# Patient Record
Sex: FEMALE | Race: WHITE | NOT HISPANIC OR LATINO | Employment: OTHER | URBAN - METROPOLITAN AREA
[De-identification: names, ages, dates, MRNs, and addresses within clinical notes are randomized per-mention and may not be internally consistent; named-entity substitution may affect disease eponyms.]

---

## 2018-02-02 ENCOUNTER — HOSPITAL ENCOUNTER (EMERGENCY)
Facility: CLINIC | Age: 68
Discharge: HOME | End: 2018-02-02
Attending: EMERGENCY MEDICINE | Admitting: EMERGENCY MEDICINE
Payer: COMMERCIAL

## 2018-02-02 VITALS
OXYGEN SATURATION: 97 % | RESPIRATION RATE: 16 BRPM | SYSTOLIC BLOOD PRESSURE: 131 MMHG | DIASTOLIC BLOOD PRESSURE: 80 MMHG | TEMPERATURE: 97.4 F

## 2018-02-02 DIAGNOSIS — R21 RASH AND NONSPECIFIC SKIN ERUPTION: ICD-10-CM

## 2018-02-02 PROCEDURE — 99282 EMERGENCY DEPT VISIT SF MDM: CPT

## 2018-02-02 RX ORDER — HYDROXYZINE HYDROCHLORIDE 25 MG/1
25-50 TABLET, FILM COATED ORAL EVERY 6 HOURS PRN
Qty: 25 TABLET | Refills: 1 | Status: SHIPPED | OUTPATIENT
Start: 2018-02-02

## 2018-02-02 NOTE — ED AVS SNAPSHOT
[facility] Emergency Department    [address] [place] [address]    Phone:  [phone]    Fax:  [phone]                                       Paola An   MRN: [id number]    Department:  [facility] Emergency Department   Date of Visit:  2/2/2018           Patient Information     Date Of Birth          1950        Your diagnoses for this visit were:     Rash and nonspecific skin eruption        You were seen by Aldo Kerr MD.        Discharge Instructions       Please see a dermatologist (skin doctor) in the next 2 days for a recheck.    [facility]: [phone]  or  [facility]:  [phone]    Return to the Emergency Room if you develop fevers more than 101, worsening redness in the area or spreading redness in the affected area or if you have any new concerns about your health.        It was my pleasure to take care of you today. Thanks for visiting [facility] Emergency Room.     Aldo Kerr MD        24 Hour Appointment Hotline       To make an appointment at any [facility] clinic, call [phone] ([phone]). If you don't have a family doctor or clinic, we will help you find one. [facility] clinics are conveniently located to serve the needs of you and your family.             Review of your medicines      START taking        Dose / Directions Last dose taken    hydrOXYzine 25 MG tablet   Commonly known as:  ATARAX   Dose:  25-50 mg   Quantity:  25 tablet        Take 1-2 tablets (25-50 mg) by mouth every 6 hours as needed for itching   Refills:  1          Our records show that you are taking the medicines listed below. If these are incorrect, please call your family doctor or clinic.        Dose / Directions Last dose taken    AMLODIPINE BESYLATE PO   Dose:  2.5 mg        Take 2.5 mg by mouth daily   Refills:  0        ATENOLOL PO   Dose:  50 mg        Take 50 mg by mouth 2 times daily   Refills:  0         CARISOPRODOL PO   Dose:  350 mg        Take 350 mg by mouth 3 times daily   Refills:  0        CLONAZEPAM PO   Dose:  1 mg        Take 1 mg by mouth   Refills:  0        ESCITALOPRAM OXALATE PO   Dose:  20 mg        Take 20 mg by mouth   Refills:  0        ESTRADIOL PO   Dose:  1 mg        Take 1 mg by mouth daily   Refills:  0        LOVASTATIN PO   Dose:  40 mg        Take 40 mg by mouth At Bedtime   Refills:  0        PANTOPRAZOLE SODIUM PO   Dose:  40 mg        Take 40 mg by mouth   Refills:  0                Prescriptions were sent or printed at these locations (1 Prescription)                   Other Prescriptions                Printed at Department/Unit printer (1 of 1)         hydrOXYzine (ATARAX) 25 MG tablet                Orders Needing Specimen Collection     None      Pending Results     No orders found from 1/31/2018 to 2/3/2018.            Pending Culture Results     No orders found from 1/31/2018 to 2/3/2018.            Pending Results Instructions     If you had any lab results that were not finalized at the time of your Discharge, you can call the ED Lab Result RN at [phone]. You will be contacted by this team for any positive Lab results or changes in treatment. The nurses are available 7 days a week from 10A to 6:30P.  You can leave a message 24 hours per day and they will return your call.        Test Results From Your Hospital Stay               Clinical Quality Measure: Blood Pressure Screening     Your blood pressure was checked while you were in the emergency department today. The last reading we obtained was  BP: 131/80 . Please read the guidelines below about what these numbers mean and what you should do about them.  If your systolic blood pressure (the top number) is less than 120 and your diastolic blood pressure (the bottom number) is less than 80, then your blood pressure is normal. There is nothing more that you need to do about it.  If your systolic blood pressure (the  "top number) is 120-139 or your diastolic blood pressure (the bottom number) is 80-89, your blood pressure may be higher than it should be. You should have your blood pressure rechecked within a year by a primary care provider.  If your systolic blood pressure (the top number) is 140 or greater or your diastolic blood pressure (the bottom number) is 90 or greater, you may have high blood pressure. High blood pressure is treatable, but if left untreated over time it can put you at risk for heart attack, stroke, or kidney failure. You should have your blood pressure rechecked by a primary care provider within the next 4 weeks.  If your provider in the emergency department today gave you specific instructions to follow-up with your doctor or provider even sooner than that, you should follow that instruction and not wait for up to 4 weeks for your follow-up visit.        Thank you for choosing [facility]       Thank you for choosing [facility] for your care. Our goal is always to provide you with excellent care. Hearing back from our patients is one way we can continue to improve our services. Please take a few minutes to complete the written survey that you may receive in the mail after you visit with us. Thank you!        [email or web address] Information     [employer] lets you send messages to your doctor, view your test results, renew your prescriptions, schedule appointments and more. To sign up, go to [email or web address] . Click on \"Log in\" on the left side of the screen, which will take you to the Welcome page. Then click on \"Sign up Now\" on the right side of the page.     You will be asked to enter the access code listed below, as well as some personal information. Please follow the directions to create your username and password.     Your access code is: [email or web address]  Expires: 5/3/2018  4:01 PM     Your access code will  in 90 days. If you need help or a new code, please call your [facility] clinic or [phone].      "   Care EveryWhere ID     This is your Care EveryWhere ID. This could be used by other organizations to access your [facility] medical records  [id number]        Equal Access to Services     MOLLY DE JESUS : Mario Yañez, suha orosco, bebeto gutierres, titi marmolejo. So [facility] [phone].    ATENCIÓN: Si habla español, tiene a palomino disposición servicios gratuitos de asistencia lingüística. Llame al [phone].    We comply with applicable federal civil rights laws and [place] laws. We do not discriminate on the basis of race, color, national origin, age, disability, sex, sexual orientation, or gender identity.            After Visit Summary       This is your record. Keep this with you and show to your community pharmacist(s) and doctor(s) at your next visit.

## 2018-02-02 NOTE — DISCHARGE INSTRUCTIONS
Please see a dermatologist (skin doctor) in the next 2 days for a recheck.    [facility]: [phone]  or  [facility]:  [phone]    Return to the Emergency Room if you develop fevers more than 101, worsening redness in the area or spreading redness in the affected area or if you have any new concerns about your health.        It was my pleasure to take care of you today. Thanks for visiting [facility] Emergency Room.     Aldo Kerr MD

## 2018-02-02 NOTE — ED AVS SNAPSHOT
[facility] Emergency Department    [address]    [facility] [address]    Phone:  [phone]    Fax:  [phone]                                       Paola An   MRN: [id number]    Department:  [facility] Emergency Department   Date of Visit:  2/2/2018           After Visit Summary Signature Page     I have received my discharge instructions, and my questions have been answered. I have discussed any challenges I see with this plan with the nurse or doctor.    ..........................................................................................................................................  Patient/Patient Representative Signature      ..........................................................................................................................................  Patient Representative Print Name and Relationship to Patient    ..................................................               ................................................  Date                                            Time    ..........................................................................................................................................  Reviewed by Signature/Title    ...................................................              ..............................................  Date                                                            Time

## 2018-02-02 NOTE — ED PROVIDER NOTES
History     Chief Complaint:  Inset Bites     HPI   Paola An is a 67 year old female who presents for evaluation of insect bites. The patient reports that since September of 2017 she has been getting bitten by insects in her sleep and has developed an itchy rash surrounding the bites. These bites are most prominent over her extremities. She believes that the insects are either flees or mites. She had a terminator inspect her bed who did not feel that these were bed bugs. She does not live with any pets.     Allergies:  Sulfa drugs  Codeine  Benadryl     Medications:    Pantoprazole  Escitalopram oxalate  Atenolol  Lovastatin  Clonazepam  Estradiol  Amlodipine besylate  Carisoprodol     Past Medical History:    Hypertension     Past Surgical History:    History reviewed. No pertinent past surgical history.     Family History:    History reviewed. No pertinent family history.     Social History:  Marital status:    Single   Accompanied to ED by:  Alone     Review of Systems   Skin:        (+) Insect bites    All other systems reviewed and are negative.    Physical Exam   First Vitals:  BP: 131/80  Heart Rate: 71  Temp: 97.4  F (36.3  C)  Resp: 16  SpO2: 97 %      Physical Exam  General: Patient is alert and interactive when I enter the room  Head:  The scalp, face, and head appear normal  Eyes:  The pupils are equal, round, and reactive to light    Conjunctivae and sclerae are normal  ENT:    External acoustic canals are normal    The oropharynx is normal without erythema.     Uvula is in the midline  Neck:  Normal range of motion  CV:  Regular rate. S1/S2. No murmurs.   Resp:  Lungs are clear without wheezes or rales. No distress  GI:  Abdomen is soft, no rigidity, guarding, or rebound    No distension. No tenderness to palpation in any quadrant.     MS:  Normal tone. Joints grossly normal without effusions.     No asymmetric leg swelling, calf or thigh tenderness.      Normal motor assessment of all  extremities.  Skin:  Scattered excoriated red to scabbed over papules, no vesicles, no cellulitis.     Normal capillary refill noted  Neuro: Speech is normal and fluent. Face is symmetric.     Moving all extremities well.   Psych:  Awake. Alert.  Normal affect.  Appropriate interactions.  Lymph: No anterior cervical lymphadenopathy noted    Emergency Department Course     Emergency Department Course:  Nursing notes and vitals reviewed.  1527: I performed an exam of the patient as documented above.     Findings and plan explained to the Patient. Patient discharged home with instructions regarding supportive care, medications, and reasons to return. The importance of close follow-up was reviewed. The patient was prescribed Hydroxyzine.      Impression & Plan      Medical Decision Making:  Paola An is a 67 year old female who presents for evaluation of a rash.  This appears consistent with a dermatitis.  A broad differential was considered including infection associated with rash, SBI, cellulitis, atopic dermatitis, allergic dermatitis, contact dermatitis, chemical dermatitis, lice, scabies, environmental, etc. I personally looked under the microscope and all the debris she brings in in the baggy and there are no bugs, just fibers and occasional what appears to be scabs.  outpatient regimen as noted above.  See dermatology  Her apartment has been extensively treated and I do not think this is bedbugs or scabies or fleas.  Diagnosis:    ICD-10-CM   1. Rash and nonspecific skin eruption R21       Disposition:  Discharged to home with Atarax.     Discharge Medications:  New Prescriptions    HYDROXYZINE (ATARAX) 25 MG TABLET    Take 1-2 tablets (25-50 mg) by mouth every 6 hours as needed for itching         I, Lyndon Beck, am serving as a scribe at 3:27 PM on 2/2/2018 to document services personally performed by Dr. Kerr, based on my observations and the provider's statements to me.      [facility]  [facility] EMERGENCY DEPARTMENT       Aldo Kerr MD  02/02/18 1680

## 2018-02-02 NOTE — ED NOTES
Pt c/o bug bites. Pt states wakes up with bites and has bugs in bed. Has had terminator come and they said its not bed bugs. Patient states been having issues since September and currently living with a friend. ABCs intact and A&Ox4.

## 2018-02-09 ENCOUNTER — HOSPITAL ENCOUNTER (EMERGENCY)
Facility: CLINIC | Age: 68
Discharge: HOME | End: 2018-02-09
Attending: EMERGENCY MEDICINE | Admitting: EMERGENCY MEDICINE
Payer: COMMERCIAL

## 2018-02-09 VITALS — HEART RATE: 59 BPM | TEMPERATURE: 96.3 F | OXYGEN SATURATION: 100 % | RESPIRATION RATE: 16 BRPM

## 2018-02-09 DIAGNOSIS — R21 RASH: ICD-10-CM

## 2018-02-09 PROCEDURE — 99283 EMERGENCY DEPT VISIT LOW MDM: CPT

## 2018-02-09 RX ORDER — AZITHROMYCIN 250 MG/1
TABLET, FILM COATED ORAL
Qty: 6 TABLET | Refills: 0 | Status: SHIPPED | OUTPATIENT
Start: 2018-02-09 | End: 2018-02-14

## 2018-02-09 ASSESSMENT — ENCOUNTER SYMPTOMS
FEVER: 0
HEADACHES: 1
COLOR CHANGE: 0
WOUND: 0
CHILLS: 0

## 2018-02-09 NOTE — ED AVS SNAPSHOT
[facility] Emergency Department    [address]    [facility] [address]    Phone:  [phone]    Fax:  [phone]                                       Paola An   MRN: [id number]    Department:  [facility] Emergency Department   Date of Visit:  2/9/2018           After Visit Summary Signature Page     I have received my discharge instructions, and my questions have been answered. I have discussed any challenges I see with this plan with the nurse or doctor.    ..........................................................................................................................................  Patient/Patient Representative Signature      ..........................................................................................................................................  Patient Representative Print Name and Relationship to Patient    ..................................................               ................................................  Date                                            Time    ..........................................................................................................................................  Reviewed by Signature/Title    ...................................................              ..............................................  Date                                                            Time

## 2018-02-09 NOTE — ED AVS SNAPSHOT
[facility] Emergency Department    [address] [place] [address]    Phone:  [phone]    Fax:  [phone]                                       Paola An   MRN: [id number]    Department:  [facility] Emergency Department   Date of Visit:  2/9/2018           Patient Information     Date Of Birth          1950        Your diagnoses for this visit were:     Rash        You were seen by Jose Cintron MD.      Follow-up Information     Follow up with [facility]. Go in 1 week.    Specialty:  Family Medicine    Contact information:    [address]  [facility] [address] [phone]        Discharge Instructions       Follow up with your clinic for stool culture results    24 Hour Appointment Hotline       To make an appointment at any [facility], call [phone] ([phone]). If you don't have a family doctor or clinic, we will help you find one. [facility] clinics are conveniently located to serve the needs of you and your family.          ED Discharge Orders     Ova and Parasite Exam Routine                    Review of your medicines      Our records show that you are taking the medicines listed below. If these are incorrect, please call your family doctor or clinic.        Dose / Directions Last dose taken    AMLODIPINE BESYLATE PO   Dose:  2.5 mg        Take 2.5 mg by mouth daily   Refills:  0        ATENOLOL PO   Dose:  50 mg        Take 50 mg by mouth 2 times daily   Refills:  0        CARISOPRODOL PO   Dose:  350 mg        Take 350 mg by mouth 3 times daily   Refills:  0        CLONAZEPAM PO   Dose:  1 mg        Take 1 mg by mouth   Refills:  0        ESCITALOPRAM OXALATE PO   Dose:  20 mg        Take 20 mg by mouth   Refills:  0        ESTRADIOL PO   Dose:  1 mg        Take 1 mg by mouth daily   Refills:  0        hydrOXYzine 25 MG tablet   Commonly known as:  ATARAX   Dose:  25-50 mg   Quantity:  25  tablet        Take 1-2 tablets (25-50 mg) by mouth every 6 hours as needed for itching   Refills:  1        LOVASTATIN PO   Dose:  40 mg        Take 40 mg by mouth At Bedtime   Refills:  0        PANTOPRAZOLE SODIUM PO   Dose:  40 mg        Take 40 mg by mouth   Refills:  0                Orders Needing Specimen Collection     None      Pending Results     No orders found from 2/7/2018 to 2/10/2018.            Pending Culture Results     No orders found from 2/7/2018 to 2/10/2018.            Pending Results Instructions     If you had any lab results that were not finalized at the time of your Discharge, you can call the ED Lab Result RN at [phone]. You will be contacted by this team for any positive Lab results or changes in treatment. The nurses are available 7 days a week from 10A to 6:30P.  You can leave a message 24 hours per day and they will return your call.        Test Results From Your Hospital Stay               Clinical Quality Measure: Blood Pressure Screening     Your blood pressure was checked while you were in the emergency department today. The last reading we obtained was    . Please read the guidelines below about what these numbers mean and what you should do about them.  If your systolic blood pressure (the top number) is less than 120 and your diastolic blood pressure (the bottom number) is less than 80, then your blood pressure is normal. There is nothing more that you need to do about it.  If your systolic blood pressure (the top number) is 120-139 or your diastolic blood pressure (the bottom number) is 80-89, your blood pressure may be higher than it should be. You should have your blood pressure rechecked within a year by a primary care provider.  If your systolic blood pressure (the top number) is 140 or greater or your diastolic blood pressure (the bottom number) is 90 or greater, you may have high blood pressure. High blood pressure is treatable, but if left untreated over time it  "can put you at risk for heart attack, stroke, or kidney failure. You should have your blood pressure rechecked by a primary care provider within the next 4 weeks.  If your provider in the emergency department today gave you specific instructions to follow-up with your doctor or provider even sooner than that, you should follow that instruction and not wait for up to 4 weeks for your follow-up visit.        Thank you for choosing [facility]       Thank you for choosing [facility] for your care. Our goal is always to provide you with excellent care. Hearing back from our patients is one way we can continue to improve our services. Please take a few minutes to complete the written survey that you may receive in the mail after you visit with us. Thank you!        [employer] Information     [employer] lets you send messages to your doctor, view your test results, renew your prescriptions, schedule appointments and more. To sign up, go to [email or web address] . Click on \"Log in\" on the left side of the screen, which will take you to the Welcome page. Then click on \"Sign up Now\" on the right side of the page.     You will be asked to enter the access code listed below, as well as some personal information. Please follow the directions to create your username and password.     Your access code is: [email or web address]  Expires: 5/3/2018  4:01 PM     Your access code will  in 90 days. If you need help or a new code, please call your [facility] clinic or [phone].        Care EveryWhere ID     This is your Care EveryWhere ID. This could be used by other organizations to access your [facility] medical records  [id number]        Equal Access to Services     [facility] Hospital[facility]ERRICA : Hadii waqar Yañez, waaxda luqadaha, qaybta kaaltiti carranza . So [facility] [phone].    ATENCIÓN: Si habla español, tiene a palomino disposición servicios gratuitos de asistencia lingüística. Llame al " [phone].    We comply with applicable federal civil rights laws and [place] laws. We do not discriminate on the basis of race, color, national origin, age, disability, sex, sexual orientation, or gender identity.            After Visit Summary       This is your record. Keep this with you and show to your community pharmacist(s) and doctor(s) at your next visit.

## 2018-02-09 NOTE — ED NOTES
Patient states she had a bug infestation when she lived in her house in [place].  Would wake up with bug parts in her bed and bites all over her.  Now, when she blows her nose she finds bug body parts in the sputum.  Also has been losing weight and is concerned she might have a tapeworm.   Has also been getting headaches since the bug bites started.      ABCs intact.  Alert and oriented x 3.

## 2018-02-10 NOTE — ED PROVIDER NOTES
History     Chief Complaint:  Concern for Bugs in Body    HPI   Paola An is a 67 year old female with a history of hypertension who presents with a concern about bugs in her body. The patient states she first noticed a bug infestation in her home in [place] about 5 months ago. She notes she has had multiple inspectors out to check for the bugs and they sprayed her home, but were unable to determine if there were any bugs present. She states she has never seen a full insect, but has noticed little pieces of bugs throughout her home, especially in her bed. The patient was recently seen here by Dr. Kerr on 2/2/18 for a rash from the bug bites, and he prescribed Hydroxyzine for the rash. Today, the patient presented to the ED because she is concerned about the bugs infesting her body. The patient reports she has been blowing these small black bug-like objects out of her nose throughout the day. She states she continues to be bitten by them since she moved to [place] and has continued to have a headache and lose weight, all of which are symptoms she noticed shortly after the bug infestation. The patient notes the bites do not itch very long. The patient reports she is concerned about having tapeworm because she read that flea ingestion can cause tapeworm and, since she has been losing weight, she thought this was a reasonable conclusion. She states she has not been to a dermatologist since her previous visit to the ED one week ago.    Allergies:  Sulfa drugs: anaphylaxis  Codeine: nightmares  Benadryl: palpitations     Medications:    Pantoprazole sodium  Escitalopram oxalate  Atenolol  Lovastatin  Clonazepam  Estradiol  Amlodipine besylate  Carisoprodol  Hydroxyzine    Past Medical History:    Hypertension    Past Surgical History:    History reviewed. No pertinent surgical history.    Family History:    History reviewed. No pertinent family history.     Social History:  Smoking status: No  Alcohol use:  No  Marital Status:  [4]     Review of Systems   Constitutional: Negative for chills and fever.   HENT: Negative for congestion.    Skin: Negative for color change, rash and wound.   Neurological: Positive for headaches.   All other systems reviewed and are negative.    Physical Exam     Patient Vitals for the past 24 hrs:   Temp Temp src Pulse Resp SpO2   02/09/18 1629 96.3  F (35.7  C) Temporal 59 16 100 %     Physical Exam   Constitutional: She appears well-developed and well-nourished.   HENT:   Right Ear: External ear normal.   Left Ear: External ear normal.   Mouth/Throat: Oropharynx is clear and moist. No oropharyngeal exudate.   TM's clear bilaterally   Eyes: Conjunctivae are normal. Pupils are equal, round, and reactive to light. No scleral icterus.   Neck: Normal range of motion. Neck supple.   Cardiovascular: Normal rate, regular rhythm, normal heart sounds and intact distal pulses.  Exam reveals no gallop and no friction rub.    No murmur heard.  Pulmonary/Chest: Effort normal and breath sounds normal. No respiratory distress. She has no wheezes. She has no rales.   Abdominal: Soft. Bowel sounds are normal. She exhibits no distension and no mass. There is no tenderness.   Lymphadenopathy:     She has no cervical adenopathy.   Neurological: She is alert.   Skin: Skin is warm and dry. No rash noted.   Psychiatric: She has a normal mood and affect.     Emergency Department Course   Emergency Department Course:  Past medical records, nursing notes, and vitals reviewed.  1803: I performed an exam of the patient and obtained history, as documented above.    1856: I rechecked the patient. I answered the patient's questions at this time.    I rechecked the patient. Findings and plan explained to the patient. Patient discharged home with instructions regarding supportive care, medications, and reasons to return. The importance of close follow-up was reviewed.     Impression & Plan    Medical Decision  Making:  Paola An is a 67 year old female who presents to the ED with a concern for a possible bug infestation. The patient has very non-specific non-pruritic, red rash on her legs and arms. There was nothing on her trunk that I could see. She was already here one week ago and evaluated by Dr. Kerr who thought she probably had dermatitis. She showed me something she blew out of her nose this morning that looked like pieces of dried up blood clots and some nasal drainage. I did not actually see any bugs, but I could not verify as it was quite smashed together. I asked her to have an  come by to her current place of residence and check it out. She was concerned that she might have tapeworm or some type of a parasite in body, so I ordered an outpatient stool culture that she can have evaluated. She should follow-up with her regular doctor for the culture results. She was understanding of this. I see no reason to do any further laboratory evaluation. I did ask her if she wanted to have a CBC checked for anemia, but she declined. I do not see anything to suggest scabies, fleas, or lice. I am not sure what she is seeing, but she is also apparently contacting a different  to come by and check it out. I encouraged her to follow-up with that and also with dermatology.    Diagnosis:    ICD-10-CM   1. Rash R21     Disposition: Discharged to home    Discharge Medications:  New Prescriptions    AZITHROMYCIN (ZITHROMAX Z-JUAN) 250 MG TABLET    Two tablets on the first day, then one tablet daily for the next 4 days     Mari Chang  2/9/2018   [facility] EMERGENCY DEPARTMENT    I, Mari Chang, am serving as a scribe at 6:03 PM on 2/9/2018 to document services personally performed by Jose Cintron MD based on my observations and the provider's statements to me.          Jose Cintron MD  02/10/18 0153

## 2018-02-13 ENCOUNTER — HOSPITAL ENCOUNTER (OUTPATIENT)
Dept: LAB | Facility: CLINIC | Age: 68
Discharge: HOME | End: 2018-02-13
Attending: EMERGENCY MEDICINE | Admitting: EMERGENCY MEDICINE
Payer: COMMERCIAL

## 2018-02-13 DIAGNOSIS — R21 RASH: ICD-10-CM

## 2018-02-13 PROCEDURE — 87177 OVA AND PARASITES SMEARS: CPT | Performed by: EMERGENCY MEDICINE

## 2018-02-13 PROCEDURE — 87209 SMEAR COMPLEX STAIN: CPT | Performed by: EMERGENCY MEDICINE

## 2018-02-14 LAB
O+P STL MICRO: NORMAL
O+P STL MICRO: NORMAL
SPECIMEN SOURCE: NORMAL

## 2019-04-24 ENCOUNTER — COMMUNICATION - HEALTHEAST (OUTPATIENT)
Dept: SCHEDULING | Facility: CLINIC | Age: 69
End: 2019-04-24

## 2021-05-28 NOTE — TELEPHONE ENCOUNTER
RN Triage:     Patient calling in with a question on her release from the hospital. She has questions about receiving opioids during her hospital stay. Per juan review she had orders for tramadol. No opioids given.     Florencia Hilliard RN, BSN [facility] Triage Nurse    Reason for Disposition    Caller has medication question only, adult not sick, and triager answers question    Protocols used: MEDICATION QUESTION CALL-A-

## 2021-06-16 PROBLEM — G89.29 CHRONIC BILATERAL LOW BACK PAIN WITHOUT SCIATICA: Status: ACTIVE | Noted: 2018-05-08

## 2021-06-16 PROBLEM — L98.8 MORGELLONS SYNDROME: Status: ACTIVE | Noted: 2018-05-09

## 2021-06-16 PROBLEM — I95.9 HYPOTENSION: Status: ACTIVE | Noted: 2019-04-11

## 2021-06-16 PROBLEM — F29 PSYCHOSIS (H): Status: ACTIVE | Noted: 2018-05-08

## 2021-06-16 PROBLEM — M54.50 CHRONIC BILATERAL LOW BACK PAIN WITHOUT SCIATICA: Status: ACTIVE | Noted: 2018-05-08

## 2024-03-20 ENCOUNTER — HOSPITAL ENCOUNTER (EMERGENCY)
Facility: CLINIC | Age: 74
Discharge: HOME | End: 2024-03-21
Attending: EMERGENCY MEDICINE | Admitting: EMERGENCY MEDICINE
Payer: COMMERCIAL

## 2024-03-20 DIAGNOSIS — S90.424A BLISTER OF TOE OF RIGHT FOOT WITHOUT INFECTION, INITIAL ENCOUNTER: ICD-10-CM

## 2024-03-20 DIAGNOSIS — I10 HYPERTENSION, UNSPECIFIED TYPE: ICD-10-CM

## 2024-03-20 DIAGNOSIS — F22 DELUSIONS OF PARASITOSIS (H): ICD-10-CM

## 2024-03-20 LAB
ALBUMIN UR-MCNC: NEGATIVE MG/DL
ANION GAP SERPL CALCULATED.3IONS-SCNC: 11 MMOL/L (ref 7–15)
APPEARANCE UR: CLEAR
BASOPHILS # BLD AUTO: 0.1 10E3/UL (ref 0–0.2)
BASOPHILS NFR BLD AUTO: 1 %
BILIRUB UR QL STRIP: NEGATIVE
BUN SERPL-MCNC: 14.1 MG/DL (ref 8–23)
CALCIUM SERPL-MCNC: 9.6 MG/DL (ref 8.8–10.2)
CHLORIDE SERPL-SCNC: 104 MMOL/L (ref 98–107)
COLOR UR AUTO: ABNORMAL
CREAT SERPL-MCNC: 0.97 MG/DL (ref 0.51–0.95)
DEPRECATED HCO3 PLAS-SCNC: 26 MMOL/L (ref 22–29)
EGFRCR SERPLBLD CKD-EPI 2021: 62 ML/MIN/1.73M2
EOSINOPHIL # BLD AUTO: 0.2 10E3/UL (ref 0–0.7)
EOSINOPHIL NFR BLD AUTO: 3 %
ERYTHROCYTE [DISTWIDTH] IN BLOOD BY AUTOMATED COUNT: 13.4 % (ref 10–15)
ETHANOL SERPL-MCNC: <0.01 G/DL
GLUCOSE SERPL-MCNC: 89 MG/DL (ref 70–99)
GLUCOSE UR STRIP-MCNC: NEGATIVE MG/DL
HCT VFR BLD AUTO: 36.5 % (ref 35–47)
HGB BLD-MCNC: 12.3 G/DL (ref 11.7–15.7)
HGB UR QL STRIP: NEGATIVE
IMM GRANULOCYTES # BLD: 0 10E3/UL
IMM GRANULOCYTES NFR BLD: 0 %
KETONES UR STRIP-MCNC: NEGATIVE MG/DL
LEUKOCYTE ESTERASE UR QL STRIP: NEGATIVE
LYMPHOCYTES # BLD AUTO: 2.4 10E3/UL (ref 0.8–5.3)
LYMPHOCYTES NFR BLD AUTO: 38 %
MCH RBC QN AUTO: 30.4 PG (ref 26.5–33)
MCHC RBC AUTO-ENTMCNC: 33.7 G/DL (ref 31.5–36.5)
MCV RBC AUTO: 90 FL (ref 78–100)
MONOCYTES # BLD AUTO: 0.5 10E3/UL (ref 0–1.3)
MONOCYTES NFR BLD AUTO: 8 %
MUCOUS THREADS #/AREA URNS LPF: PRESENT /LPF
NEUTROPHILS # BLD AUTO: 3.1 10E3/UL (ref 1.6–8.3)
NEUTROPHILS NFR BLD AUTO: 50 %
NITRATE UR QL: NEGATIVE
NRBC # BLD AUTO: 0 10E3/UL
NRBC BLD AUTO-RTO: 0 /100
PH UR STRIP: 6.5 [PH] (ref 5–7)
PLATELET # BLD AUTO: 218 10E3/UL (ref 150–450)
POTASSIUM SERPL-SCNC: 3.6 MMOL/L (ref 3.4–5.3)
RBC # BLD AUTO: 4.05 10E6/UL (ref 3.8–5.2)
RBC URINE: <1 /HPF
SODIUM SERPL-SCNC: 141 MMOL/L (ref 135–145)
SP GR UR STRIP: 1.02 (ref 1–1.03)
SQUAMOUS EPITHELIAL: 1 /HPF
UROBILINOGEN UR STRIP-MCNC: NORMAL MG/DL
WBC # BLD AUTO: 6.3 10E3/UL (ref 4–11)
WBC URINE: <1 /HPF

## 2024-03-20 PROCEDURE — 36415 COLL VENOUS BLD VENIPUNCTURE: CPT | Performed by: EMERGENCY MEDICINE

## 2024-03-20 PROCEDURE — 85025 COMPLETE CBC W/AUTO DIFF WBC: CPT | Performed by: EMERGENCY MEDICINE

## 2024-03-20 PROCEDURE — 80048 BASIC METABOLIC PNL TOTAL CA: CPT | Performed by: EMERGENCY MEDICINE

## 2024-03-20 PROCEDURE — 99285 EMERGENCY DEPT VISIT HI MDM: CPT

## 2024-03-20 PROCEDURE — 81001 URINALYSIS AUTO W/SCOPE: CPT | Performed by: EMERGENCY MEDICINE

## 2024-03-20 PROCEDURE — 250N000013 HC RX MED GY IP 250 OP 250 PS 637: Performed by: EMERGENCY MEDICINE

## 2024-03-20 PROCEDURE — 93005 ELECTROCARDIOGRAM TRACING: CPT | Mod: RTG

## 2024-03-20 PROCEDURE — 82077 ASSAY SPEC XCP UR&BREATH IA: CPT | Performed by: EMERGENCY MEDICINE

## 2024-03-20 RX ORDER — QUETIAPINE FUMARATE 50 MG/1
50 TABLET, FILM COATED ORAL ONCE
Status: COMPLETED | OUTPATIENT
Start: 2024-03-20 | End: 2024-03-20

## 2024-03-20 RX ADMIN — QUETIAPINE FUMARATE 50 MG: 50 TABLET ORAL at 23:19

## 2024-03-20 ASSESSMENT — ACTIVITIES OF DAILY LIVING (ADL)
ADLS_ACUITY_SCORE: 35
ADLS_ACUITY_SCORE: 35

## 2024-03-20 ASSESSMENT — COLUMBIA-SUICIDE SEVERITY RATING SCALE - C-SSRS
1. IN THE PAST MONTH, HAVE YOU WISHED YOU WERE DEAD OR WISHED YOU COULD GO TO SLEEP AND NOT WAKE UP?: NO
2. HAVE YOU ACTUALLY HAD ANY THOUGHTS OF KILLING YOURSELF IN THE PAST MONTH?: NO
6. HAVE YOU EVER DONE ANYTHING, STARTED TO DO ANYTHING, OR PREPARED TO DO ANYTHING TO END YOUR LIFE?: NO

## 2024-03-21 VITALS
HEART RATE: 66 BPM | DIASTOLIC BLOOD PRESSURE: 89 MMHG | OXYGEN SATURATION: 100 % | RESPIRATION RATE: 16 BRPM | BODY MASS INDEX: 27.66 KG/M2 | SYSTOLIC BLOOD PRESSURE: 170 MMHG | HEIGHT: 65 IN | TEMPERATURE: 98.3 F

## 2024-03-21 PROBLEM — F41.9 ANXIETY: Status: ACTIVE | Noted: 2024-03-21

## 2024-03-21 LAB
ATRIAL RATE - MUSE: 58 BPM
DIASTOLIC BLOOD PRESSURE - MUSE: NORMAL MMHG
INTERPRETATION ECG - MUSE: NORMAL
P AXIS - MUSE: 37 DEGREES
PR INTERVAL - MUSE: 204 MS
QRS DURATION - MUSE: 82 MS
QT - MUSE: 446 MS
QTC - MUSE: 437 MS
R AXIS - MUSE: 14 DEGREES
SYSTOLIC BLOOD PRESSURE - MUSE: NORMAL MMHG
T AXIS - MUSE: 19 DEGREES
VENTRICULAR RATE- MUSE: 58 BPM

## 2024-03-21 PROCEDURE — 250N000013 HC RX MED GY IP 250 OP 250 PS 637: Performed by: EMERGENCY MEDICINE

## 2024-03-21 RX ORDER — ACETAMINOPHEN 500 MG
1000 TABLET ORAL ONCE
Status: COMPLETED | OUTPATIENT
Start: 2024-03-21 | End: 2024-03-21

## 2024-03-21 RX ORDER — CLONIDINE HYDROCHLORIDE 0.1 MG/1
0.1 TABLET ORAL 2 TIMES DAILY
COMMUNITY

## 2024-03-21 RX ORDER — ATENOLOL 50 MG/1
50 TABLET ORAL DAILY
Status: DISCONTINUED | OUTPATIENT
Start: 2024-03-21 | End: 2024-03-21 | Stop reason: HOSPADM

## 2024-03-21 RX ORDER — HYDROXYZINE HYDROCHLORIDE 25 MG/1
25 TABLET, FILM COATED ORAL 3 TIMES DAILY PRN
Qty: 12 TABLET | Refills: 0 | Status: SHIPPED | OUTPATIENT
Start: 2024-03-21

## 2024-03-21 RX ORDER — ATORVASTATIN CALCIUM 40 MG/1
40 TABLET, FILM COATED ORAL AT BEDTIME
COMMUNITY

## 2024-03-21 RX ORDER — LOSARTAN POTASSIUM AND HYDROCHLOROTHIAZIDE 12.5; 1 MG/1; MG/1
1 TABLET ORAL AT BEDTIME
COMMUNITY

## 2024-03-21 RX ORDER — LORAZEPAM 1 MG/1
1 TABLET ORAL EVERY 8 HOURS PRN
Status: DISCONTINUED | OUTPATIENT
Start: 2024-03-21 | End: 2024-03-21 | Stop reason: HOSPADM

## 2024-03-21 RX ORDER — LANOLIN ALCOHOL/MO/W.PET/CERES
3 CREAM (GRAM) TOPICAL AT BEDTIME
Status: DISCONTINUED | OUTPATIENT
Start: 2024-03-21 | End: 2024-03-21 | Stop reason: HOSPADM

## 2024-03-21 RX ORDER — QUETIAPINE FUMARATE 50 MG/1
50 TABLET, FILM COATED ORAL AT BEDTIME
COMMUNITY

## 2024-03-21 RX ORDER — CLONIDINE HYDROCHLORIDE 0.1 MG/1
0.1 TABLET ORAL 2 TIMES DAILY
Status: DISCONTINUED | OUTPATIENT
Start: 2024-03-21 | End: 2024-03-21 | Stop reason: HOSPADM

## 2024-03-21 RX ORDER — QUETIAPINE FUMARATE 50 MG/1
50 TABLET, FILM COATED ORAL AT BEDTIME
Status: DISCONTINUED | OUTPATIENT
Start: 2024-03-21 | End: 2024-03-21 | Stop reason: HOSPADM

## 2024-03-21 RX ADMIN — Medication 3 MG: at 01:23

## 2024-03-21 RX ADMIN — ACETAMINOPHEN 1000 MG: 500 TABLET, FILM COATED ORAL at 01:10

## 2024-03-21 RX ADMIN — LORAZEPAM 1 MG: 1 TABLET ORAL at 01:24

## 2024-03-21 ASSESSMENT — ACTIVITIES OF DAILY LIVING (ADL)
ADLS_ACUITY_SCORE: 35

## 2024-03-21 NOTE — ED TRIAGE NOTES
"Arrived via EMS.  Pt states out of two of her four BP/Anxiety meds for 2-3 weeks.  BP at home 180s.  Also states \"I have microscopic mites that keep biting me and are getting into my wounds.\"     Triage Assessment (Adult)       Row Name 03/20/24 6734          Triage Assessment    Airway WDL WDL        Respiratory WDL    Respiratory WDL WDL        Skin Circulation/Temperature WDL    Skin Circulation/Temperature WDL WDL        Cardiac WDL    Cardiac WDL all        Chest Pain Assessment    Associated Signs/Symptoms hypertension                     "

## 2024-03-21 NOTE — ED PROVIDER NOTES
"  History     Chief Complaint:  Hypertension and Anxiety       HPI   Paola An is a 73 year old female with a history of hypertension and psychosis presenting for increased anxiety and concerns of hypertension.  Patient reports she moved from [place] 5 days ago.  She is currently residing in an apartment alone.  She reports her daughter lives here and she wanted to be closer to her.  Patient reports having bugs under her skin and they are crawling all over her, sucking out her blood.  She reports this has been an ongoing problem for the past 6 months.  Patient reports that she also has concern that she has been out of her blood pressure medications (atenolol, clonidine and hyzaar) as well as her Seroquel and clonidine for the past few weeks.  She expresses concerns her BP is too high.  She denies any alcohol use or drug use.  No fever, chills, chest pain, dyspnea, abdominal pain, headache, urinary complaints.  She denies hallucinations or suicidal ideations.  She does report having blisters to her R. Great toe from wearing too tight of shoes.  She reports following with a psychiatrist in [place] though not recently.      Independent Historian:   None - Patient Only    Review of External Notes:    3/3/23 office visit      Medications:    AMLODIPINE BESYLATE PO  ATENOLOL PO  CARISOPRODOL PO  CLONAZEPAM PO  ESCITALOPRAM OXALATE PO  ESTRADIOL PO  hydrOXYzine (ATARAX) 25 MG tablet  LOVASTATIN PO  PANTOPRAZOLE SODIUM PO        Past Medical History:    Past Medical History:   Diagnosis Date    Hypertension        Past Surgical History:    No past surgical history on file.     Physical Exam   Patient Vitals for the past 24 hrs:   BP Temp Temp src Pulse Resp SpO2 Height   03/20/24 2128 (!) 163/81 98.3  F (36.8  C) Oral 65 16 97 % 1.651 m (5' 5\")        Physical Exam  Nursing note and vitals reviewed.  Constitutional: Well nourished.   Eyes: Conjunctiva normal.  Pupils are equal, round, and reactive to light.   ENT: Nose " normal. Mucous membranes pink and moist.    Neck: Normal range of motion.  CVS: Normal rate, regular rhythm.  Normal heart sounds. 2/2 DP pulses.   Pulmonary: Lungs clear to auscultation bilaterally. No wheezes/rales/rhonchi.  GI: Abdomen soft. Nontender, nondistended. No rigidity or guarding.    MSK: No calf tenderness or swelling. R. Great toe with fluid filled blister to dorsal aspect, no surrounding erythema/warmth, full active ROM R. Great toe. 3rd left hooper with small superficial clear-fluid filled blister to dorsal toe, full active ROM toe.   Neuro: Alert. Follows simple commands.  Skin: Skin is warm and dry. N  Psychiatric: Anxious. Admits to visual hallucinations of seeing bugs crawling all over her.     Emergency Department Course     ECG results from 03/20/24   EKG 12-lead, tracing only     Value    Systolic Blood Pressure     Diastolic Blood Pressure     Ventricular Rate 58    Atrial Rate 58    PA Interval 204    QRS Duration 82        QTc 437    P Axis 37    R AXIS 14    T Axis 19    Interpretation ECG      Sinus bradycardia with Premature supraventricular complexes  Otherwise normal ECG  When compared with ECG of 11-APR-2019 09:17,  Premature supraventricular complexes are now Present  PA interval has decreased  Nonspecific T wave abnormality no longer evident in Anterolateral leads  QT has shortened         Laboratory:  Labs Ordered and Resulted from Time of ED Arrival to Time of ED Departure   BASIC METABOLIC PANEL - Abnormal       Result Value    Sodium 141      Potassium 3.6      Chloride 104      Carbon Dioxide (CO2) 26      Anion Gap 11      Urea Nitrogen 14.1      Creatinine 0.97 (*)     GFR Estimate 62      Calcium 9.6      Glucose 89     ROUTINE UA WITH MICROSCOPIC - Abnormal    Color Urine Light Yellow      Appearance Urine Clear      Glucose Urine Negative      Bilirubin Urine Negative      Ketones Urine Negative      Specific Gravity Urine 1.017      Blood Urine Negative      pH Urine  6.5      Protein Albumin Urine Negative      Urobilinogen Urine Normal      Nitrite Urine Negative      Leukocyte Esterase Urine Negative      Mucus Urine Present (*)     RBC Urine <1      WBC Urine <1      Squamous Epithelials Urine 1     ETHYL ALCOHOL LEVEL - Normal    Alcohol ethyl <0.01     CBC WITH PLATELETS AND DIFFERENTIAL    WBC Count 6.3      RBC Count 4.05      Hemoglobin 12.3      Hematocrit 36.5      MCV 90      MCH 30.4      MCHC 33.7      RDW 13.4      Platelet Count 218      % Neutrophils 50      % Lymphocytes 38      % Monocytes 8      % Eosinophils 3      % Basophils 1      % Immature Granulocytes 0      NRBCs per 100 WBC 0      Absolute Neutrophils 3.1      Absolute Lymphocytes 2.4      Absolute Monocytes 0.5      Absolute Eosinophils 0.2      Absolute Basophils 0.1      Absolute Immature Granulocytes 0.0      Absolute NRBCs 0.0            Emergency Department Course & Assessments:    Interventions:  Medications   QUEtiapine (SEROquel) tablet 50 mg (has no administration in time range)          Independent Interpretation (X-rays, CTs, rhythm strip):  None    Consultations/Discussion of Management or Tests:  Attempted to call patient's daughter Sharon [phone] without response       Social Determinants of Health affecting care:   None    Disposition:  Care of the patient was transferred to my colleague Dr. Toney pending DEC and dispo.     Impression & Plan        Medical Decision Making:  Patient is a 73-year-old female presenting with a myriad of complaints of predominantly concerns for hypertension as well as describing bugs under her skin.  She is mildly hypertensive on arrival though overall nontoxic, in no significant distress.  EKG without ischemic changes.  She denies any active chest pain.  Labs without evidence to suggest endorgan dysfunction or metabolic derangement.  UA without evidence of obvious infection.  No evidence to suggest hypertensive emergency today.  She denies any  concerning headache and is without focal deficits.  I encouraged compliance with her BP medications as prescribed and close PCP followup. Maintain BP diary.  She had her purse with atenolol and hydrochlorothiazide prescriptions.  She also has noted superficial blister to her toe, no surrounding findings to suggest cellulitis. Wound was cleaned and dressed.  Bacitracin application recommended.      Patient currently is on an DINO and has remained cooperative.  I do have concerns for mental health decompensation particularly given her delusions of parasitosis. Review of records shows these delusions have been oingoing since 2018, if not before.  I did attempt to contact patient's daughter though without response.  She is signed out to my partner Dr. Toney pending DEC evaluation and final disposition.  I do feel she would benefit from at minimal close outpatient psychiatric followup given she reportedly recently moved to [place].       Diagnosis:    ICD-10-CM    1. Delusions of parasitosis (H)  F22       2. Hypertension, unspecified type  I10       3. Blister of toe of right foot without infection, initial encounter  S90.424A            Discharge Medications:  New Prescriptions    No medications on file        3/20/2024   Liana Black, Liana Rowe,   03/21/24 0509

## 2024-03-21 NOTE — DISCHARGE INSTRUCTIONS
Additional Information  Today you were seen by a licensed mental health professional through Triage and Transition services, [facility]  for a crisis assessment in the Emergency Department at [facility].  It is recommended that you follow up with your established providers (psychiatrist, mental health therapist, and/or primary care doctor - as relevant) as soon as possible.

## 2024-03-21 NOTE — ED PROVIDER NOTES
7:38 AM 3/21/2024     Received signout this morning from Dr. Black pending DEC assessment.  I discussed with Marie MASSEY at this time.  She states that patient is safe for discharge at this time.  She has a psychiatry appointment scheduled on Monday.  Patient has no SI or HI.  I evaluated the patient she reports no suicidal homicidal thoughts.  States she is comfortable with plan for discharge home.  States that she does plan to see her psychiatrist on Monday and is establish care with a primary care doctor.  She is currently stable for discharge.    DO Feng Azevedo Tiffani, DO  03/21/24 0743
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
## 2024-05-22 ENCOUNTER — HOSPITAL ENCOUNTER (EMERGENCY)
Facility: CLINIC | Age: 74
Discharge: HOME | End: 2024-05-22
Attending: EMERGENCY MEDICINE | Admitting: EMERGENCY MEDICINE
Payer: COMMERCIAL

## 2024-05-22 VITALS
DIASTOLIC BLOOD PRESSURE: 56 MMHG | TEMPERATURE: 97.9 F | HEART RATE: 68 BPM | RESPIRATION RATE: 18 BRPM | OXYGEN SATURATION: 97 % | SYSTOLIC BLOOD PRESSURE: 104 MMHG

## 2024-05-22 DIAGNOSIS — R29.898 ARM DYSFUNCTION: ICD-10-CM

## 2024-05-22 PROCEDURE — 99283 EMERGENCY DEPT VISIT LOW MDM: CPT

## 2024-05-22 ASSESSMENT — COLUMBIA-SUICIDE SEVERITY RATING SCALE - C-SSRS
6. HAVE YOU EVER DONE ANYTHING, STARTED TO DO ANYTHING, OR PREPARED TO DO ANYTHING TO END YOUR LIFE?: NO
2. HAVE YOU ACTUALLY HAD ANY THOUGHTS OF KILLING YOURSELF IN THE PAST MONTH?: NO
1. IN THE PAST MONTH, HAVE YOU WISHED YOU WERE DEAD OR WISHED YOU COULD GO TO SLEEP AND NOT WAKE UP?: NO

## 2024-05-22 ASSESSMENT — ACTIVITIES OF DAILY LIVING (ADL)
ADLS_ACUITY_SCORE: 35
ADLS_ACUITY_SCORE: 35

## 2024-05-22 NOTE — ED TRIAGE NOTES
"Arrives via EMS from home for R arm discomfort. Per EMS, about 25 mins ago patient was reaching down to open a cabinet and all of the sudden \"she felt her like her arm was heavy, and like someone was grabbing her and pulling her arm down, dropping involuntarily. Ambulatory. Just moved into a new apartment, has been lifting a lot recently. VSS. .         "

## 2024-05-22 NOTE — ED PROVIDER NOTES
Emergency Department Note      History of Present Illness     Chief Complaint  Arm discomfort    HPI  Paola An is a 74 year old female with a past medical history of hypertension here for evaluation of arm discomfort. Patient states that about 30-40 minutes ago she was reaching for something in a drawer when her right arm moved and touched the left arm.  She states that she did not know her right arm moved.  A bit later when using the restroom she states that her right arm was moving but it was not doing what she told it to do. Denies numbness or weakness of her extremities. A few weeks ago she moved into a new apartment that involved 10 hours of lifting. Denies recent falls.  Patient reports that she had an aneurysm at 1 point, but had resolved when she had repeat imaging in [place].    Independent Historian  None    Review of External Notes  Reviewed patient's telephone interaction with neurosurgery on 7/15/2021, patient had an MRI of her brain and was found to have an aneurysm.  Past Medical History   Medical History and Problem List  Hypertension  Morgellons syndrome   Psychosis   Hypercholesteremia   Chronic bilateral low back pain   Anxiety   Hypotension   Aneurysm   Ekbom's delusional parasitosis       Medications  Atarax   Amlodipine   Lipitor   Clonazepam   Catapres  Estradiol   Escitalopram oxalate   Lovastatin   Pantoprazole sodium   Seroquel    Surgical History   D & C  Hysterectomy   Physical Exam   Patient Vitals for the past 24 hrs:   BP Temp Temp src Pulse Resp SpO2   05/22/24 2000 104/56 -- -- 68 -- --   05/22/24 1930 121/63 -- -- 69 -- --   05/22/24 1915 -- -- -- -- -- 97 %   05/22/24 1900 116/72 -- -- 73 -- --   05/22/24 1825 125/68 -- -- -- -- 98 %   05/22/24 1824 (!) 124/96 97.9  F (36.6  C) Oral 75 18 98 %     Physical Exam  Gen: No distress.  Nontoxic.  Head: Atraumatic.  No hematomas, lesions, abrasions  Neck: No midline tenderness of the spine.  Mouth: No oral lesions, or abrasions.   Mucous membranes are moist.  Resp: Equal breath sounds, normal respiratory effort  Cardio: Regular rate and rhythm, no murmurs  Abdomen: Soft, nontender, nondistended  MSK; no extremity swelling, bruising, or abrasions.  Neuro: Cranial nerves II through XII intact.  5 out of 5 muscle strength in the upper and lower extremities.  Sensation intact in the upper and lower extremities.  Finger-to-nose negative.  Heel-to-shin negative.  No truncal ataxia.  Psych: Appropriate affect.      Diagnostics   Lab Results   Labs Ordered and Resulted from Time of ED Arrival to Time of ED Departure - No data to display    Imaging  No orders to display       Independent Interpretation  None  ED Course    Medications Administered  Medications - No data to display    Procedures  Procedures     Discussion of Management  None    Social Determinants of Health adding to complexity of care  None    ED Course  ED Course as of 05/22/24 2326   Wed May 22, 2024   1844 I obtained history and examined the patient as noted above.      Medical Decision Making / Diagnosis   CMS Diagnoses: None    MIPS     None    MDM  Paola An is a 74 year old female presents to the emergency department with a complaint of abnormal movements of her right arm.  Patient reports that her arm is doing things that she is not telling it to do.  She denies any weakness or numbness.  She states that it reached over and touched her left arm without her knowing it.  Patient has never had this happen before.  She states she thinks this is coming from her chronic low back pain.  She reports that her symptoms have resolved, and her arm is now moving the way that it should.  Patient denies any trauma or falls.  She is not having any vision changes.  No headaches.  No red flag signs.  On exam, patient does not have any weakness of her right arm.  No sensation changes.  It is moving appropriately.  No ataxia.  No signs of any trauma.  No swelling.  I did discuss with the  patient testing here in the emergency department, we can get an MRI of her brain and neck, to check for stroke, mass, aneurysm.  Do not think that she needs a CT at this point as her symptoms have resolved and this would be a TIA workup.  We can also get lab work.  Patient refuses imaging of her brain or any lab work.  She states that she would like an MRI of her back.  I did let her know that I am more than happy to get an MRI to evaluate for stroke, but at this point I do not see any indication to get an MRI of her back.  She reports that she has chronic pain in her right low back and would like an MRI for this today.  I did evaluate her back, there is no midline tenderness, she does not have any weakness or numbness in her lower extremities, no bruising or swelling, no saddle anesthesia.  I do not see any indication to get an MRI of her low back.  Patient states that she would just like to stay here to be observed for a little while.  Patient is observed in the emergency department for over 2 hours.  Patient remains asymptomatic.  I did offer the patient neurology follow-up, she states that she will just follow-up with her primary care physician.  Patient is discharged home.      Disposition  The patient was discharged.     ICD-10 Codes:    ICD-10-CM    1. Arm dysfunction  R29.898            Discharge Medications  Discharge Medication List as of 5/22/2024  8:30 PM            Scribe Disclosure:  I, Anita Saravia, am serving as a scribe at 6:58 PM on 5/22/2024 to document services personally performed by Cat Lopez MD based on my observations and the provider's statements to me.        Cat Lopez MD  05/22/24 0428

## 2024-05-23 NOTE — ED NOTES
Pleasant lady in no apparent distress. Patient reports symptoms have resolved. 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   Denies needs.

## 2024-05-23 NOTE — DISCHARGE INSTRUCTIONS
Please return to the emergency department if your symptoms increase, you experience numbness or weakness of your arm, facial droop, slurred speech.    Please follow-up with your primary care physician in the next week.

## 2024-06-25 ENCOUNTER — APPOINTMENT (OUTPATIENT)
Dept: URBAN - METROPOLITAN AREA CLINIC 253 | Age: 74
Setting detail: DERMATOLOGY
End: 2024-06-26

## 2024-06-25 VITALS — WEIGHT: 141 LBS | RESPIRATION RATE: 14 BRPM | HEIGHT: 65 IN

## 2024-06-25 DIAGNOSIS — L81.4 OTHER MELANIN HYPERPIGMENTATION: ICD-10-CM

## 2024-06-25 DIAGNOSIS — L30.9 DERMATITIS, UNSPECIFIED: ICD-10-CM

## 2024-06-25 DIAGNOSIS — L82.1 OTHER SEBORRHEIC KERATOSIS: ICD-10-CM

## 2024-06-25 DIAGNOSIS — Z71.89 OTHER SPECIFIED COUNSELING: ICD-10-CM

## 2024-06-25 DIAGNOSIS — Z12.83 ENCOUNTER FOR SCREENING FOR MALIGNANT NEOPLASM OF SKIN: ICD-10-CM

## 2024-06-25 DIAGNOSIS — D18.0 HEMANGIOMA: ICD-10-CM

## 2024-06-25 DIAGNOSIS — D22 MELANOCYTIC NEVI: ICD-10-CM

## 2024-06-25 PROBLEM — D22.5 MELANOCYTIC NEVI OF TRUNK: Status: ACTIVE | Noted: 2024-06-25

## 2024-06-25 PROBLEM — D18.01 HEMANGIOMA OF SKIN AND SUBCUTANEOUS TISSUE: Status: ACTIVE | Noted: 2024-06-25

## 2024-06-25 PROCEDURE — OTHER MIPS QUALITY: OTHER

## 2024-06-25 PROCEDURE — 99204 OFFICE O/P NEW MOD 45 MIN: CPT

## 2024-06-25 PROCEDURE — OTHER COUNSELING: OTHER

## 2024-06-25 PROCEDURE — OTHER PRESCRIPTION: OTHER

## 2024-06-25 RX ORDER — PERMETHRIN 50 MG/G
5% CREAM TOPICAL
Qty: 60 | Refills: 1 | Status: ERX | COMMUNITY
Start: 2024-06-25

## 2024-06-25 ASSESSMENT — LOCATION SIMPLE DESCRIPTION DERM
LOCATION SIMPLE: LOWER BACK
LOCATION SIMPLE: UPPER BACK

## 2024-06-25 ASSESSMENT — LOCATION DETAILED DESCRIPTION DERM
LOCATION DETAILED: SUPERIOR THORACIC SPINE
LOCATION DETAILED: INFERIOR THORACIC SPINE
LOCATION DETAILED: SUPERIOR LUMBAR SPINE

## 2024-06-25 ASSESSMENT — LOCATION ZONE DERM: LOCATION ZONE: TRUNK

## 2024-06-25 NOTE — PROCEDURE: COUNSELING
Detail Level: Generalized
Detail Level: Detailed
Patient Specific Counseling (Will Not Stick From Patient To Patient): No evidence of mite infestation on skin surface today. \\n I offered another treatment with permethrin and/or blood work to see if there is any underlying issues. She wants to do another course of permethrin.\\nShe voiced that she is getting angry from some medical providers because she saw in her medical note that the bug infestation was her being delusional. I informed her that I may consider that dx as part of the differential as I don’t see anything on exam. She states that she had blood work completed. Recommended a EUGENIO to obtain the blood work and consider additional tests as indicated
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
## 2024-07-23 ENCOUNTER — APPOINTMENT (OUTPATIENT)
Dept: URBAN - METROPOLITAN AREA CLINIC 253 | Age: 74
Setting detail: DERMATOLOGY
End: 2024-07-24

## 2024-07-23 VITALS — RESPIRATION RATE: 14 BRPM | WEIGHT: 141 LBS | HEIGHT: 64.5 IN

## 2024-07-23 DIAGNOSIS — L30.9 DERMATITIS, UNSPECIFIED: ICD-10-CM

## 2024-07-23 PROCEDURE — OTHER ADDITIONAL NOTES: OTHER

## 2024-07-23 NOTE — PROCEDURE: ADDITIONAL NOTES
Render Risk Assessment In Note?: no
Detail Level: Simple
Additional Notes: Patient came in to go over lab results with Karie Alves. Unfortunately, we had not received the results via fax, so we instructed patient to bring in a physical copy of results to her next appointment. The visit was changed to a nurse visit, whereas the patient brought in samples to be examined under microscope. Karie advised to send samples into pathology to be examined for possible mites. Called patient to offer this but was unable to each via telephone. Discussed with Cathryn, practice manager. Samples were preserved in the fridge and can be sent for pathology if patient consents.

## 2024-07-23 NOTE — HPI: OTHER
Condition:: Patient is here to discuss lab results. Unfortunately we have not received them via fax. Patient was instructed to bring paper copies to next appointment. Visit is with medical assistant today.
Please Describe Your Condition:: Patient is concerned with mite bites

## 2024-10-25 ENCOUNTER — APPOINTMENT (OUTPATIENT)
Dept: URBAN - METROPOLITAN AREA CLINIC 253 | Age: 74
Setting detail: DERMATOLOGY
End: 2024-10-28

## 2024-10-25 VITALS — WEIGHT: 132 LBS | HEIGHT: 65 IN

## 2024-10-25 DIAGNOSIS — K13.0 DISEASES OF LIPS: ICD-10-CM

## 2024-10-25 DIAGNOSIS — L30.9 DERMATITIS, UNSPECIFIED: ICD-10-CM

## 2024-10-25 PROCEDURE — OTHER ADDITIONAL NOTES: OTHER

## 2024-10-25 PROCEDURE — OTHER MIPS QUALITY: OTHER

## 2024-10-25 PROCEDURE — 99213 OFFICE O/P EST LOW 20 MIN: CPT

## 2024-10-25 PROCEDURE — OTHER COUNSELING: OTHER

## 2024-10-25 PROCEDURE — OTHER PRESCRIPTION: OTHER

## 2024-10-25 RX ORDER — HYDROCORTISONE 25 MG/G
2.5% CREAM TOPICAL BID
Qty: 30 | Refills: 0 | Status: ERX | COMMUNITY
Start: 2024-10-25

## 2024-10-25 ASSESSMENT — LOCATION SIMPLE DESCRIPTION DERM
LOCATION SIMPLE: LEFT LIP
LOCATION SIMPLE: RIGHT CHEEK

## 2024-10-25 ASSESSMENT — LOCATION DETAILED DESCRIPTION DERM
LOCATION DETAILED: LEFT INFERIOR VERMILION LIP
LOCATION DETAILED: RIGHT SUPERIOR CENTRAL MALAR CHEEK

## 2024-10-25 ASSESSMENT — LOCATION ZONE DERM
LOCATION ZONE: LIP
LOCATION ZONE: FACE

## 2024-10-25 NOTE — PROCEDURE: ADDITIONAL NOTES
Render Risk Assessment In Note?: no
Detail Level: Simple
Additional Notes: Patient came in to go over lab results with Karie Alves. We have not received the results and recommended filling out an EUGENIO so we can discuss labs at her next visit. She states she has possible demodex mites that have been affecting her eyes and other parts of her body. She notes that when she uses the permethrin cream that was prescribed it draws in the mites and makes them bite her more. The patient mentioned her anxiety affects her sleep and Karie recommended following up with her psychiatrist to discuss anxiety medication. She mentioned her stool has been dark, she’s losing weight, and her hair is falling out. Karie highly recommended she schedule a colonoscopy and f/u with GI. Offered bx of rash today, pt declined.

## 2024-10-25 NOTE — PROCEDURE: COUNSELING
Detail Level: Zone
Patient Specific Counseling (Will Not Stick From Patient To Patient): Advised to avoid products to lip aside from medication. Advised to call office immediately if worsening.
Detail Level: Detailed

## 2024-11-11 ENCOUNTER — APPOINTMENT (OUTPATIENT)
Dept: URBAN - METROPOLITAN AREA CLINIC 255 | Age: 74
Setting detail: DERMATOLOGY
End: 2024-11-12

## 2024-11-11 VITALS — WEIGHT: 138 LBS | HEIGHT: 64.5 IN

## 2024-11-11 DIAGNOSIS — K13.0 DISEASES OF LIPS: ICD-10-CM

## 2024-11-11 DIAGNOSIS — L98.8 OTHER SPECIFIED DISORDERS OF THE SKIN AND SUBCUTANEOUS TISSUE: ICD-10-CM

## 2024-11-11 DIAGNOSIS — L30.9 DERMATITIS, UNSPECIFIED: ICD-10-CM

## 2024-11-11 DIAGNOSIS — L82.1 OTHER SEBORRHEIC KERATOSIS: ICD-10-CM

## 2024-11-11 PROCEDURE — 99213 OFFICE O/P EST LOW 20 MIN: CPT

## 2024-11-11 PROCEDURE — OTHER COUNSELING: OTHER

## 2024-11-11 PROCEDURE — OTHER MIPS QUALITY: OTHER

## 2024-11-11 PROCEDURE — OTHER ADDITIONAL NOTES: OTHER

## 2024-11-11 PROCEDURE — OTHER PATIENT SPECIFIC COUNSELING: OTHER

## 2024-11-11 PROCEDURE — OTHER REASSURANCE: OTHER

## 2024-11-11 PROCEDURE — OTHER PRESCRIPTION MEDICATION MANAGEMENT: OTHER

## 2024-11-11 ASSESSMENT — LOCATION SIMPLE DESCRIPTION DERM
LOCATION SIMPLE: RIGHT UPPER BACK
LOCATION SIMPLE: LEFT FOREARM
LOCATION SIMPLE: LEFT HAND
LOCATION SIMPLE: RIGHT FOREARM
LOCATION SIMPLE: RIGHT CHEEK
LOCATION SIMPLE: RIGHT HAND
LOCATION SIMPLE: UPPER BACK
LOCATION SIMPLE: LEFT LIP

## 2024-11-11 ASSESSMENT — LOCATION DETAILED DESCRIPTION DERM
LOCATION DETAILED: LEFT INFERIOR VERMILION LIP
LOCATION DETAILED: RIGHT SUPERIOR CENTRAL MALAR CHEEK
LOCATION DETAILED: LEFT RADIAL DORSAL HAND
LOCATION DETAILED: RIGHT INFERIOR MEDIAL UPPER BACK
LOCATION DETAILED: RIGHT DISTAL DORSAL FOREARM
LOCATION DETAILED: LEFT DISTAL DORSAL FOREARM
LOCATION DETAILED: SUPERIOR THORACIC SPINE
LOCATION DETAILED: RIGHT RADIAL DORSAL HAND

## 2024-11-11 ASSESSMENT — LOCATION ZONE DERM
LOCATION ZONE: TRUNK
LOCATION ZONE: ARM
LOCATION ZONE: FACE
LOCATION ZONE: LIP
LOCATION ZONE: HAND

## 2024-11-11 ASSESSMENT — SEVERITY ASSESSMENT: SEVERITY: ALMOST CLEAR

## 2024-11-11 ASSESSMENT — BSA RASH: BSA RASH: 5

## 2024-11-11 ASSESSMENT — ITCH NUMERIC RATING SCALE: HOW SEVERE IS YOUR ITCHING?: 0

## 2024-11-11 ASSESSMENT — PAIN INTENSITY VAS: HOW INTENSE IS YOUR PAIN 0 BEING NO PAIN, 10 BEING THE MOST SEVERE PAIN POSSIBLE?: NO PAIN

## 2024-11-11 NOTE — PROCEDURE: ADDITIONAL NOTES
Render Risk Assessment In Note?: no
Detail Level: Simple
Additional Notes: We have not received the results after filling out EUGENIO at [facility]. She states she has possible demodex mites that have been affecting her eyes and other parts of her body. She notes that when she uses the permethrin cream that was prescribed it draws in the mites and makes them bite her more. Discussed at [facility] discontinuing this medication, pt has continued to use it.

## 2024-11-11 NOTE — PROCEDURE: COUNSELING
Patient Specific Counseling (Will Not Stick From Patient To Patient): Advised to avoid products to lip aside from medication. Advised to call office immediately if worsening.
Detail Level: Detailed
Detail Level: Zone
Detail Level: Simple

## 2024-11-11 NOTE — PROCEDURE: PATIENT SPECIFIC COUNSELING
-Recommended patient follow up with ophthalmologist for further evaluation on her eyes.\\n
Detail Level: Zone

## 2024-11-11 NOTE — PROCEDURE: PRESCRIPTION MEDICATION MANAGEMENT
Render In Strict Bullet Format?: No
Continue Regimen: Apply hydrocortisone 2.5% cream to lips BID up to 14 days with flares.
Detail Level: Zone

## 2024-12-23 ENCOUNTER — APPOINTMENT (OUTPATIENT)
Dept: GENERAL RADIOLOGY | Facility: CLINIC | Age: 74
End: 2024-12-23
Attending: EMERGENCY MEDICINE
Payer: COMMERCIAL

## 2024-12-23 ENCOUNTER — HOSPITAL ENCOUNTER (EMERGENCY)
Facility: CLINIC | Age: 74
Discharge: HOME | End: 2024-12-23
Attending: EMERGENCY MEDICINE
Payer: COMMERCIAL

## 2024-12-23 VITALS
BODY MASS INDEX: 23.95 KG/M2 | SYSTOLIC BLOOD PRESSURE: 122 MMHG | TEMPERATURE: 98 F | RESPIRATION RATE: 18 BRPM | OXYGEN SATURATION: 95 % | WEIGHT: 143.74 LBS | HEART RATE: 84 BPM | HEIGHT: 65 IN | DIASTOLIC BLOOD PRESSURE: 88 MMHG

## 2024-12-23 DIAGNOSIS — R06.01 ORTHOPNEA: ICD-10-CM

## 2024-12-23 DIAGNOSIS — I48.20 CHRONIC A-FIB (H): ICD-10-CM

## 2024-12-23 LAB
ANION GAP SERPL CALCULATED.3IONS-SCNC: 12 MMOL/L (ref 7–15)
ATRIAL RATE - MUSE: NORMAL BPM
BASOPHILS # BLD AUTO: 0 10E3/UL (ref 0–0.2)
BASOPHILS NFR BLD AUTO: 1 %
BUN SERPL-MCNC: 14.9 MG/DL (ref 8–23)
CALCIUM SERPL-MCNC: 9.6 MG/DL (ref 8.8–10.4)
CHLORIDE SERPL-SCNC: 105 MMOL/L (ref 98–107)
CREAT SERPL-MCNC: 0.88 MG/DL (ref 0.51–0.95)
DIASTOLIC BLOOD PRESSURE - MUSE: NORMAL MMHG
EGFRCR SERPLBLD CKD-EPI 2021: 69 ML/MIN/1.73M2
EOSINOPHIL # BLD AUTO: 0.2 10E3/UL (ref 0–0.7)
EOSINOPHIL NFR BLD AUTO: 2 %
ERYTHROCYTE [DISTWIDTH] IN BLOOD BY AUTOMATED COUNT: 13.5 % (ref 10–15)
GLUCOSE SERPL-MCNC: 92 MG/DL (ref 70–99)
HCO3 SERPL-SCNC: 24 MMOL/L (ref 22–29)
HCT VFR BLD AUTO: 40.6 % (ref 35–47)
HGB BLD-MCNC: 13.6 G/DL (ref 11.7–15.7)
HOLD SPECIMEN: NORMAL
HOLD SPECIMEN: NORMAL
IMM GRANULOCYTES # BLD: 0 10E3/UL
IMM GRANULOCYTES NFR BLD: 0 %
INTERPRETATION ECG - MUSE: NORMAL
LYMPHOCYTES # BLD AUTO: 2.5 10E3/UL (ref 0.8–5.3)
LYMPHOCYTES NFR BLD AUTO: 30 %
MCH RBC QN AUTO: 30.6 PG (ref 26.5–33)
MCHC RBC AUTO-ENTMCNC: 33.5 G/DL (ref 31.5–36.5)
MCV RBC AUTO: 91 FL (ref 78–100)
MONOCYTES # BLD AUTO: 0.5 10E3/UL (ref 0–1.3)
MONOCYTES NFR BLD AUTO: 6 %
NEUTROPHILS # BLD AUTO: 5.2 10E3/UL (ref 1.6–8.3)
NEUTROPHILS NFR BLD AUTO: 62 %
NRBC # BLD AUTO: 0 10E3/UL
NRBC BLD AUTO-RTO: 0 /100
NT-PROBNP SERPL-MCNC: 4491 PG/ML (ref 0–900)
P AXIS - MUSE: NORMAL DEGREES
PLATELET # BLD AUTO: 246 10E3/UL (ref 150–450)
POTASSIUM SERPL-SCNC: 4.1 MMOL/L (ref 3.4–5.3)
PR INTERVAL - MUSE: NORMAL MS
QRS DURATION - MUSE: 72 MS
QT - MUSE: 370 MS
QTC - MUSE: 450 MS
R AXIS - MUSE: 33 DEGREES
RBC # BLD AUTO: 4.45 10E6/UL (ref 3.8–5.2)
SODIUM SERPL-SCNC: 141 MMOL/L (ref 135–145)
SYSTOLIC BLOOD PRESSURE - MUSE: NORMAL MMHG
T AXIS - MUSE: 46 DEGREES
TROPONIN T SERPL HS-MCNC: 8 NG/L
VENTRICULAR RATE- MUSE: 89 BPM
WBC # BLD AUTO: 8.4 10E3/UL (ref 4–11)

## 2024-12-23 PROCEDURE — 80048 BASIC METABOLIC PNL TOTAL CA: CPT | Performed by: EMERGENCY MEDICINE

## 2024-12-23 PROCEDURE — 85048 AUTOMATED LEUKOCYTE COUNT: CPT | Performed by: EMERGENCY MEDICINE

## 2024-12-23 PROCEDURE — 93005 ELECTROCARDIOGRAM TRACING: CPT

## 2024-12-23 PROCEDURE — 84484 ASSAY OF TROPONIN QUANT: CPT | Performed by: EMERGENCY MEDICINE

## 2024-12-23 PROCEDURE — 71046 X-RAY EXAM CHEST 2 VIEWS: CPT

## 2024-12-23 PROCEDURE — 82565 ASSAY OF CREATININE: CPT | Performed by: EMERGENCY MEDICINE

## 2024-12-23 PROCEDURE — 83880 ASSAY OF NATRIURETIC PEPTIDE: CPT | Performed by: EMERGENCY MEDICINE

## 2024-12-23 PROCEDURE — 85004 AUTOMATED DIFF WBC COUNT: CPT | Performed by: EMERGENCY MEDICINE

## 2024-12-23 PROCEDURE — 36415 COLL VENOUS BLD VENIPUNCTURE: CPT | Performed by: EMERGENCY MEDICINE

## 2024-12-23 PROCEDURE — 99285 EMERGENCY DEPT VISIT HI MDM: CPT | Mod: 25

## 2024-12-23 ASSESSMENT — ACTIVITIES OF DAILY LIVING (ADL)
ADLS_ACUITY_SCORE: 41
ADLS_ACUITY_SCORE: 41

## 2024-12-23 NOTE — ED TRIAGE NOTES
Pt states that she has SOB and wheezing X2 days.  She states it almost feels like she has tightening in her throat.  Sats in triage 98% on RA.  Pt states that she is concerned about the wheezing as this has never happened before, she also states that her BP has been high, she states that she has a hx of anxiety and has recently been started on a new medication.  Pt has hx of a-fib and she is concerned for her heart.    She further has concerns for mites and parasites from her condo in [place].       Triage Assessment (Adult)       Row Name 12/23/24 1256          Triage Assessment    Airway WDL WDL        Respiratory WDL    Respiratory WDL X;rhythm/pattern     Rhythm/Pattern, Respiratory shortness of breath

## 2024-12-23 NOTE — DISCHARGE INSTRUCTIONS
It was a pleasure taking care of you today. I hope you feel much better soon.  Please follow-up with your cardiologist as scheduled.  Return immediately for difficulty breathing, chest pain, or any other concerns.

## 2024-12-23 NOTE — ED PROVIDER NOTES
"  Emergency Department Note      History of Present Illness     Chief Complaint   Shortness of Breath    HPI   Paola An is a 74 year old female with a history including hypertension, hyperlipidemia, atrial fibrillation, and anxiety who presents to the ED for evaluation of shortness of breath. The patient reports feeling short of breath for the last 2 days when lying down.  When supine, she feels like her throat is tightening and is concerned she has been wheezing. Alongside this, a couple of weeks ago, she was diagnosed with atrial fibrillation and notes her anxiety has been high because of this. She was started a medication and is now feeling less anxious. She is on 81 mg of aspirin. No blood thinner. No recent illness, cough, or cold. No chest pain or pressure. No leg swelling. She notes her fingers are occasionally swollen when she wakes up.     Independent Historian   None    Review of External Notes   Reviewed office visit from 12/16/2024    Past Medical History     Medical History and Problem List   Hypertension  Morgellons syndrome   Psychosis   Hypercholesteremia   Chronic bilateral low back pain   Anxiety   Hypotension   Aneurysm   Ekbom's delusional parasitosis     Medications   Atarax   Amlodipine   Lipitor   Clonazepam   Catapres  Estradiol   Escitalopram oxalate   Lovastatin   Pantoprazole sodium   Seroquel    Surgical History   D & C  Hysterectomy    Physical Exam     Patient Vitals for the past 24 hrs:   BP Temp Temp src Pulse Resp SpO2 Height Weight   12/23/24 1737 122/88 98  F (36.7  C) Oral 84 18 95 % -- --   12/23/24 1301 (!) 141/120 97.2  F (36.2  C) Temporal 87 18 98 % 1.638 m (5' 4.5\") 65.2 kg (143 lb 11.8 oz)     Physical Exam  Constitutional: Alert, attentive   HENT:    Nose: Nose normal.    Mouth/Throat: Oropharynx is clear, mucous membranes are moist   Eyes: EOM are normal.   CV: regular rate and rhythm; no murmurs, rubs or gallups  Chest: Effort normal and breath sounds normal.   GI: "  There is no tenderness. No distension. Normal bowel sounds  MSK: Normal range of motion.   Neurological: Alert, attentive  Skin: Skin is warm and dry.      Diagnostics     Lab Results   Labs Ordered and Resulted from Time of ED Arrival to Time of ED Departure   NT PROBNP INPATIENT - Abnormal       Result Value    N terminal Pro BNP Inpatient 4,491 (*)    BASIC METABOLIC PANEL - Normal    Sodium 141      Potassium 4.1      Chloride 105      Carbon Dioxide (CO2) 24      Anion Gap 12      Urea Nitrogen 14.9      Creatinine 0.88      GFR Estimate 69      Calcium 9.6      Glucose 92     TROPONIN T, HIGH SENSITIVITY - Normal    Troponin T, High Sensitivity 8     CBC WITH PLATELETS AND DIFFERENTIAL    WBC Count 8.4      RBC Count 4.45      Hemoglobin 13.6      Hematocrit 40.6      MCV 91      MCH 30.6      MCHC 33.5      RDW 13.5      Platelet Count 246      % Neutrophils 62      % Lymphocytes 30      % Monocytes 6      % Eosinophils 2      % Basophils 1      % Immature Granulocytes 0      NRBCs per 100 WBC 0      Absolute Neutrophils 5.2      Absolute Lymphocytes 2.5      Absolute Monocytes 0.5      Absolute Eosinophils 0.2      Absolute Basophils 0.0      Absolute Immature Granulocytes 0.0      Absolute NRBCs 0.0       Imaging   XR Chest 2 Views   Final Result   IMPRESSION: Cardiac silhouette is at the upper limit of normal in   size. Possible trace interstitial edema without definite airspace   consolidation. Small bilateral pleural effusions with associated   compressive atelectasis. No acute bony abnormality.      ALLISON EUBANKS MD            SYSTEM ID:  [id number]        EKG   ECG taken at 1314, ECG read at 1315  Atrial fibrillation  Low voltage QRS  Abnormal ECG   Rate 89 bpm. AL interval * ms. QRS duration 72 ms. QT/QTc 370/450 ms. P-R-T axes * 33 46.    Independent Interpretation   No acute consolidation on chest x-ray    ED Course      ED Course   ED Course as of 12/23/24 1941   Mon Dec 23, 2024   1534 I  obtained history and examined the patient as noted above.    1740 I rechecked the patient and explained findings.      Medical Decision Making / Diagnosis     MDM   Paola An is a 74 year old female who presents for evaluation of intermittent orthopnea over the last 2 weeks.  This is in the context of recently diagnosed A-fib which is present today but rate controlled.  She has had previous conversations regarding anticoagulation and declined that pending a cardiology evaluation in January.  Cardiopulmonary exam is normal except for A-fib today.  BNP is moderately elevated but she is not clinically in CHF.  Suspect her symptoms of orthopnea could be related to slight relative volume overload although again the patient is not currently short of breath nor does she have lower extreme edema.  I had an extended shared decision made conversation with the patient regarding both anticoagulation.  She declines indicating she understands risk of thromboembolic event.  I offered a short trial of Lasix and she declined, noting symptoms are mild and manageable at this time.  Discussed symptoms that would indicate CHF exacerbation. Recommended she return immediately for shortness of breath, lower extremity swelling, chest pain, or any other concerns.  Cardiology follow-up as scheduled.    Disposition   The patient was discharged.     Diagnosis     ICD-10-CM    1. Chronic a-fib (H)  I48.20       2. Orthopnea  R06.01         Discharge Medications   Discharge Medication List as of 12/23/2024  5:46 PM        Scribe Disclosure:  I, Beck Parada, am serving as a scribe at 5:17 PM on 12/23/2024 to document services personally performed by Hossein Martínez MD, based on my observations and the provider's statements to me.        Hossein Martínez MD  12/23/24 [id number]

## 2024-12-28 ENCOUNTER — NURSE TRIAGE (OUTPATIENT)
Dept: NURSING | Facility: CLINIC | Age: 74
End: 2024-12-28
Payer: COMMERCIAL

## 2024-12-28 NOTE — TELEPHONE ENCOUNTER
Nurse Triage SBAR    Is this a 2nd Level Triage? NO    Situation: Breathing difficulty    Background:  Pt reports breathing difficulty and slow, shallow, weak breathing. Pt sounds somewhat out of breath to Writer.     Assessment:  Breathing difficulty     Protocol Recommended Disposition:   Call  Now    Recommendation:  Call 911 now per protocol.      Does the patient meet one of the following criteria for ADS visit consideration? 16+ years old, with an [facility] PCP     TIP  Providers, please consider if this condition is appropriate for management at one of our Acute and Diagnostic Services sites.     If patient is a good candidate, please use dotphrase <dot>triageresponse and select Refer to ADS to document.    Reason for Disposition   Slow, shallow and weak breathing    Additional Information   Negative: SEVERE difficulty breathing (e.g., struggling for each breath, speaks in single words)   Negative: [1] Breathing stopped AND [2] hasn't returned   Negative: Choking on something   Negative: Bluish (or gray) lips or face now   Negative: Difficult to awaken or acting confused (e.g., disoriented, slurred speech)   Negative: Passed out (i.e., lost consciousness, collapsed and was not responding)   Negative: Wheezing started suddenly after medicine, an allergic food or bee sting   Negative: Stridor (harsh sound while breathing in)    Protocols used: Breathing Difficulty-A-AH

## 2025-03-18 ENCOUNTER — APPOINTMENT (OUTPATIENT)
Dept: URBAN - METROPOLITAN AREA CLINIC 253 | Age: 75
Setting detail: DERMATOLOGY
End: 2025-03-18

## 2025-03-18 VITALS — RESPIRATION RATE: 14 BRPM | WEIGHT: 142 LBS | HEIGHT: 64.5 IN

## 2025-03-18 DIAGNOSIS — L57.0 ACTINIC KERATOSIS: ICD-10-CM

## 2025-03-18 DIAGNOSIS — L30.9 DERMATITIS, UNSPECIFIED: ICD-10-CM

## 2025-03-18 PROCEDURE — OTHER LIQUID NITROGEN: OTHER

## 2025-03-18 PROCEDURE — OTHER PRESCRIPTION: OTHER

## 2025-03-18 PROCEDURE — OTHER COUNSELING: OTHER

## 2025-03-18 PROCEDURE — OTHER PRESCRIPTION MEDICATION MANAGEMENT: OTHER

## 2025-03-18 PROCEDURE — OTHER MIPS QUALITY: OTHER

## 2025-03-18 PROCEDURE — 17000 DESTRUCT PREMALG LESION: CPT

## 2025-03-18 PROCEDURE — 99214 OFFICE O/P EST MOD 30 MIN: CPT | Mod: 25

## 2025-03-18 RX ORDER — METRONIDAZOLE 7.5 MG/G
CREAM TOPICAL
Qty: 45 | Refills: 0 | Status: CANCELLED
Stop reason: CLARIF

## 2025-03-18 RX ORDER — METRONIDAZOLE 7.5 MG/G
CREAM TOPICAL
Qty: 45 | Refills: 0 | Status: ERX | COMMUNITY
Start: 2025-03-18

## 2025-03-18 RX ORDER — NYSTATIN CREAM 100000 [USP'U]/G
CREAM TOPICAL
Qty: 30 | Refills: 0 | Status: ERX | COMMUNITY
Start: 2025-03-18

## 2025-03-18 ASSESSMENT — LOCATION DETAILED DESCRIPTION DERM
LOCATION DETAILED: RIGHT SUPERIOR HELIX
LOCATION DETAILED: LEFT LOWER CUTANEOUS LIP
LOCATION DETAILED: RIGHT INFERIOR MEDIAL MALAR CHEEK
LOCATION DETAILED: LEFT UPPER CUTANEOUS LIP

## 2025-03-18 ASSESSMENT — LOCATION SIMPLE DESCRIPTION DERM
LOCATION SIMPLE: LEFT LIP
LOCATION SIMPLE: RIGHT EAR
LOCATION SIMPLE: RIGHT CHEEK

## 2025-03-18 ASSESSMENT — LOCATION ZONE DERM
LOCATION ZONE: LIP
LOCATION ZONE: EAR
LOCATION ZONE: FACE

## 2025-03-18 NOTE — PROCEDURE: LIQUID NITROGEN
Post-Care Instructions: I reviewed with the patient in detail post-care instructions. Patient is to wear sunprotection, and avoid picking at any of the treated lesions. Pt may apply Vaseline to crusted or scabbing areas.
Render Post-Care Instructions In Note?: yes
Consent: The patient's consent was obtained including but not limited to risks of crusting, scabbing, blistering, scarring, darker or lighter pigmentary change, recurrence, incomplete removal and infection.
Render Note In Bullet Format When Appropriate: No
Number Of Freeze-Thaw Cycles: 2 freeze-thaw cycles
Detail Level: Detailed
Duration Of Freeze Thaw-Cycle (Seconds): 2
Application Tool (Optional): Liquid Nitrogen Sprayer

## 2025-03-18 NOTE — PROCEDURE: PRESCRIPTION MEDICATION MANAGEMENT
Plan: Discussed trying nystatin and/or metronidazole creams. Patient is interested in trying both of these. She also inquired about ivermectin, would recommend trying these topicals first. Gave patient the option for blood work and/or biopsy for further work up, she declines today.
Render In Strict Bullet Format?: No
Initiate Treatment: Nystatin cream BID\\nMetronidazole cream BID
Detail Level: Zone